# Patient Record
(demographics unavailable — no encounter records)

---

## 2025-04-29 NOTE — HISTORY OF PRESENT ILLNESS
[FreeTextEntry1] : 58yo F with no significant PMHx presents with complaints of PADMINI positive.  It was done when she complained of generalized pain.  Rt shoulder, left forearm but no swelling. Heavy weights can trigger/worsen the pain.   No dry eyes, no dry mouth but had many cavities over the past 5yrs.  Has severe vaginal dryness.  No rash.    No FH of autoimmune illness.

## 2025-04-29 NOTE — REVIEW OF SYSTEMS
[TextEntry] :  Head: No Alopecia, no scalp pain, no temporal headaches, no hearing loss, no red/painful eyes, no dry eyes ,no dry mouth, no painless oral ulcers,+poor dentition,no jaw claudication ENT: No enlarged lymph nodes, no parotid swelling,no dysphagia CVS: No Positional chest pain Pulm: No SOB, no cough, no hemoptysis, no pleuritic chest pain Abdomen:  No constipation,no diarrhea, no postprandial pain Skin: No rash,no dry skin,no tight skin,no nodules, no Raynaud's MSK: no joint swelling,no morning stiffness, no back pain :  No spontaneous , no blood in urine Heme: No clots,no hx of low WBC,no hx of low Hb,no hx of low platelets Neuro: No tingling,no numbness,no foot drop, no weakness, no seizures, no temporal headaches Systemic: No fevers, no weight loss, no night sweats No H/o radiation therapy, no recent hospitalization

## 2025-04-29 NOTE — PHYSICAL EXAM
[TextEntry] : General: alert, well appearing, no distress HEENT: no hair thinning or alopecia, clear conjunctiva, no oral or nasal ulcers, no cervical lymphadenopathy Cardiac: S1+, S2+,normal rate and rhythm, no murmur, rubs or gallops Pulm: normal respiratory effort, clear to auscultation bilaterally GI: abdomen soft, non-tender and non-distended   MSK: Hand-DIP joints, PIP joints, MCP joints, CMC joints without evidence of synovitis or effusion. Intact and nonpainful range of motion. No Heberden/Cayetano nodes. Wrist, elbow,without evidence of synovitis or effusion. Intact and nonpainful range of motion. Shoulder right-painful arc+ Foot/ankle/knee/hip joints without erythema/effusion/tenderness to palpation and with intact nonpainful range of motion. Spine: normal appearance, no tenderness, normal ROM   Skin: No rashes, warm and well-perfused. No nail pitting, digital ulceration, dactylitis, or telangiectasias. No subcutaneous nodules.

## 2025-06-26 NOTE — DISCUSSION/SUMMARY
[FreeTextEntry1] : 59F here for evaluation of lung nodules  #Lung nodules  - CT chest from 3/2025 reviewed. She has small calcified nodules in RUL and a 3mm lingula nodule - Discussed that the calcified nodule likely represents prior injury with scar formation and has little malignancy potential. The small lingula 3mm nodule also appears benign. She is overall low risk for primary lung ca and so does not require routine radiographic surveillance - Lung parenchyma also show no e/o ILD related to possible CTD for which she is undergoing work up for

## 2025-06-26 NOTE — PHYSICAL EXAM
[No Acute Distress] : no acute distress [Normal Appearance] : normal appearance [No Neck Mass] : no neck mass [Normal Rate/Rhythm] : normal rate/rhythm [Normal S1, S2] : normal s1, s2 [No Murmurs] : no murmurs [No Resp Distress] : no resp distress [No Acc Muscle Use] : no acc muscle use [Clear to Auscultation Bilaterally] : clear to auscultation bilaterally [No Abnormalities] : no abnormalities [No Clubbing] : no clubbing [No Cyanosis] : no cyanosis [No Edema] : no edema [No Focal Deficits] : no focal deficits [Oriented x3] : oriented x3 [Normal Affect] : normal affect

## 2025-06-26 NOTE — HISTORY OF PRESENT ILLNESS
[Never] : never [TextBox_4] :  Ms. PETRA UNDERWOOD is a 59 year old F here for evaluation of lung nodules.  Last fall she went to the hospital for new onset chest/R arm pain. She was worried about a heart attack as she has a FHx of heart disease. She had negative work-up but was told she had abnormality found on CT chest and instructed to follow-up.  She currently has no respiratory symptoms. She does not have shortness of breath, chest pain, cough, wheezing, hemoptysis, syncope, dysphagia, poor appetite, fatigue. She does have longstanding R shoulder/lower back pain that has been ongoing for about 7 years and current work-up has been non-revealing. She recently saw Rheumatologist for elevated PADMINI and is undergoing evaluation for Sjogrens. She was referred for lip Bx for when she saw the Dermatologist he told her she has no lesion to biopsy.  She has never smoked She works as a  She was born in [place] She has no FHx of lung ca